# Patient Record
Sex: MALE | Race: BLACK OR AFRICAN AMERICAN | Employment: UNEMPLOYED | URBAN - METROPOLITAN AREA
[De-identification: names, ages, dates, MRNs, and addresses within clinical notes are randomized per-mention and may not be internally consistent; named-entity substitution may affect disease eponyms.]

---

## 2018-07-15 ENCOUNTER — HOSPITAL ENCOUNTER (EMERGENCY)
Age: 48
Discharge: HOME | End: 2018-07-15
Attending: INTERNAL MEDICINE
Payer: MEDICARE

## 2018-07-15 VITALS
DIASTOLIC BLOOD PRESSURE: 73 MMHG | BODY MASS INDEX: 25.07 KG/M2 | SYSTOLIC BLOOD PRESSURE: 159 MMHG | RESPIRATION RATE: 16 BRPM | HEIGHT: 66 IN | OXYGEN SATURATION: 99 % | HEART RATE: 71 BPM | TEMPERATURE: 98.6 F | WEIGHT: 156 LBS

## 2018-07-15 DIAGNOSIS — J20.9 ACUTE BRONCHITIS, UNSPECIFIED ORGANISM: Primary | ICD-10-CM

## 2018-07-15 DIAGNOSIS — Z72.0 TOBACCO ABUSE: ICD-10-CM

## 2018-07-15 DIAGNOSIS — I10 ESSENTIAL HYPERTENSION: ICD-10-CM

## 2018-07-15 PROCEDURE — 94640 AIRWAY INHALATION TREATMENT: CPT

## 2018-07-15 PROCEDURE — 74011000250 HC RX REV CODE- 250: Performed by: INTERNAL MEDICINE

## 2018-07-15 PROCEDURE — 96372 THER/PROPH/DIAG INJ SC/IM: CPT

## 2018-07-15 PROCEDURE — 77030029684 HC NEB SM VOL KT MONA -A

## 2018-07-15 PROCEDURE — 74011250636 HC RX REV CODE- 250/636: Performed by: INTERNAL MEDICINE

## 2018-07-15 PROCEDURE — 99282 EMERGENCY DEPT VISIT SF MDM: CPT

## 2018-07-15 RX ORDER — IPRATROPIUM BROMIDE AND ALBUTEROL SULFATE 2.5; .5 MG/3ML; MG/3ML
3 SOLUTION RESPIRATORY (INHALATION)
Status: COMPLETED | OUTPATIENT
Start: 2018-07-15 | End: 2018-07-15

## 2018-07-15 RX ORDER — CARVEDILOL 25 MG/1
25 TABLET ORAL 2 TIMES DAILY WITH MEALS
Status: ON HOLD | COMMUNITY
End: 2021-11-10 | Stop reason: CLARIF

## 2018-07-15 RX ORDER — FOSINOPIRL SODIUM 10 MG/1
40 TABLET ORAL DAILY
Status: ON HOLD | COMMUNITY
End: 2021-11-10 | Stop reason: CLARIF

## 2018-07-15 RX ORDER — DEXAMETHASONE SODIUM PHOSPHATE 100 MG/10ML
10 INJECTION INTRAMUSCULAR; INTRAVENOUS
Status: COMPLETED | OUTPATIENT
Start: 2018-07-15 | End: 2018-07-15

## 2018-07-15 RX ORDER — OMEPRAZOLE 40 MG/1
40 CAPSULE, DELAYED RELEASE ORAL DAILY
Status: ON HOLD | COMMUNITY
End: 2021-11-10 | Stop reason: CLARIF

## 2018-07-15 RX ADMIN — DEXAMETHASONE SODIUM PHOSPHATE 10 MG: 10 INJECTION INTRAMUSCULAR; INTRAVENOUS at 07:44

## 2018-07-15 RX ADMIN — IPRATROPIUM BROMIDE AND ALBUTEROL SULFATE 3 ML: .5; 3 SOLUTION RESPIRATORY (INHALATION) at 07:46

## 2018-07-15 NOTE — ED PROVIDER NOTES
EMERGENCY DEPARTMENT HISTORY AND PHYSICAL EXAM 
 
 
Date: 7/15/2018 Patient Name: Cristian Cadena History of Presenting Illness Chief Complaint Patient presents with  Hypertension History Provided By: Patient HPI: Cristian Cadena, 50 y.o. male with PMHx significant for CKD on dialysis T/Th/Sat, HTN, DM, presents ambulatory to the ED for evaluation of elevated BP readings this morning. Pt states he checked his BP using a RUE wrist cuff with readings 242/59 and 262/98 this morning PTA, which prompted him to come to the ED for evaluation. He reports he initially checked his BP due to generalized throbbing HA this morning. He endorses compliance with his BP medication. Pt further adds experiencing nonproductive cough and chronic SOB due to smoking. He states his last full dialysis treatment was yesterday and was evaluated by his PCP last week. Pt notes his PCP took him off of insulin 2 months ago because his A1C improved. He specifically denies any recent CP, dizziness, visual changes, fevers, chills, abdominal pain, N/V, or any other complaints at this time. There are no other complaints, changes, or physical findings at this time. PCP: Magalys Dotson MD 
 
Current Outpatient Prescriptions Medication Sig Dispense Refill  carvedilol (COREG) 25 mg tablet Take 25 mg by mouth two (2) times daily (with meals).  omeprazole (PRILOSEC) 40 mg capsule Take 40 mg by mouth daily.  fosinopril (MONOPRIL) 10 mg tablet Take 40 mg by mouth daily.  ipratropium-albuterol (COMBIVENT RESPIMAT)  mcg/actuation inhaler Take 1 Puff by inhalation every six (6) hours as needed for Wheezing. 1 Inhaler 0  
 sucralfate (CARAFATE) 1 gram tablet Take 1 Tab by mouth four (4) times daily. 120 Tab 2  
 gabapentin (NEURONTIN) 300 mg capsule   6  
 RENAGEL 800 mg tablet   4  cloNIDine HCl (CATAPRES) 0.2 mg tablet Take 1 Tab by mouth two (2) times a day.  60 Tab 0  
 amLODIPine (NORVASC) 10 mg tablet Take 1 Tab by mouth daily. 30 Tab 0  
 hydrALAZINE (APRESOLINE) 100 mg tablet Take 1 Tab by mouth two (2) times a day. 60 Tab 0  
 OTHER by Other route every thirty (30) days. Indications: with dialysis  ONETOUCH ULTRA TEST strip   0  
 LANTUS SOLOSTAR 100 unit/mL (3 mL) pen   6  pravastatin (PRAVACHOL) 10 mg tablet   10  
 RENVELA 800 mg tab tab   4 Past History Past Medical History: 
Past Medical History:  
Diagnosis Date  Chronic kidney disease   
 dialysis MWF  
 Diabetes ([address]  HTN (hypertension) Past Surgical History: 
Past Surgical History:  
Procedure Laterality Date  COLONOSCOPY N/A 7/28/2016 COLONOSCOPY performed by Ariadna Strong MD at [address] [place] [address]  VASCULAR SURGERY PROCEDURE UNLIST    
 dialysis shunt l arm 2012 Family History: 
Family History Problem Relation Age of Onset  Hypertension Mother  Hypertension Sister  Heart Attack Other  Diabetes Other Social History: 
Social History Substance Use Topics  Smoking status: Current Every Day Smoker Packs/day: 0.50  Smokeless tobacco: Never Used  Alcohol use No  
 
 
Allergies: 
No Known Allergies Review of Systems Review of Systems Constitutional: Negative. Negative for activity change, appetite change, chills, fatigue, fever and unexpected weight change. HENT: Negative. Negative for congestion, hearing loss, rhinorrhea, sneezing and voice change. Eyes: Negative. Negative for pain and visual disturbance. Respiratory: Negative. Negative for apnea, cough, choking, chest tightness and shortness of breath. Cardiovascular: Negative. Negative for chest pain and palpitations. Gastrointestinal: Negative. Negative for abdominal distention, abdominal pain, blood in stool, diarrhea, nausea and vomiting. Genitourinary: Negative. Negative for difficulty urinating, flank pain, frequency and urgency. Musculoskeletal: Negative.   Negative for arthralgias, back pain, myalgias and neck stiffness. Skin: Negative. Negative for color change and rash. Neurological: Positive for headaches. Negative for dizziness, seizures, syncope, speech difficulty, weakness and numbness. Hematological: Negative for adenopathy. Psychiatric/Behavioral: Negative. Negative for agitation, behavioral problems, dysphoric mood and suicidal ideas. The patient is not nervous/anxious. Physical Exam  
Physical Exam  
Constitutional: He is oriented to person, place, and time. He appears well-developed and well-nourished. HENT:  
Head: Normocephalic and atraumatic. Mouth/Throat: Oropharynx is clear and moist.  
Eyes: Conjunctivae and EOM are normal. Pupils are equal, round, and reactive to light. Neck: Normal range of motion. Neck supple. Cardiovascular: Normal rate, regular rhythm and normal heart sounds. Exam reveals no gallop and no friction rub. No murmur heard. Pulmonary/Chest: Effort normal. No respiratory distress. He has wheezes. He has rhonchi. He has no rales. Abdominal: Soft. Bowel sounds are normal. He exhibits no distension. There is no tenderness. There is no rebound and no guarding. Musculoskeletal: Normal range of motion. He exhibits no edema or tenderness. Lymphadenopathy:  
  He has no cervical adenopathy. Neurological: He is alert and oriented to person, place, and time. He has normal strength. No cranial nerve deficit or sensory deficit. He displays a negative Romberg sign. Coordination and gait normal.  
Skin: Skin is warm and dry. No ecchymosis, no lesion and no rash noted. Rash is not urticarial. He is not diaphoretic. No erythema. Psychiatric: He has a normal mood and affect. Nursing note and vitals reviewed. Diagnostic Study Results Labs - No results found for this or any previous visit (from the past 12 hour(s)). Radiologic Studies - No orders to display Medical Decision Making I am the first provider for this patient. I reviewed the vital signs, available nursing notes, past medical history, past surgical history, family history and social history. Vital Signs-Reviewed the patient's vital signs. Patient Vitals for the past 12 hrs: 
 Temp Pulse Resp BP SpO2  
07/15/18 0747 - - - - 99 % 07/15/18 0706 98.6 °F (37 °C) 71 16 159/73 98 % Pulse Oximetry Analysis - 99% on RA Records Reviewed: Nursing Notes and Old Medical Records Provider Notes (Medical Decision Making): DDx: HTN, hypertensive urgency, bronchitis, tobacco abuse ED Course:  
Initial assessment performed. The patients presenting problems have been discussed, and they are in agreement with the care plan formulated and outlined with them. I have encouraged them to ask questions as they arise throughout their visit. Tobacco Counseling Discussed the risks of smoking and the benefits of smoking cessation as well as the long term sequelae of smoking with the patient. The patient verbalized their understanding. 8:55 AM 
Re-evaluated pt. He states his breathing has improved with duo-neb and decadron. Will discharge with prescription. He agrees to f/u with his PCP next week. Discharge Note: 
9:00 AM 
The patient has been re-evaluated and is ready for discharge. Reviewed available results with patient. Counseled patient on diagnosis and care plan. Patient has expressed understanding, and all questions have been answered. Patient agrees with plan and agrees to follow up as recommended, or to return to the ED if their symptoms worsen. Discharge instructions have been provided and explained to the patient, along with reasons to return to the ED. PLAN: 
1. Discharge Medication List as of 7/15/2018  9:11 AM  
  
START taking these medications  Details  
ipratropium-albuterol (COMBIVENT RESPIMAT)  mcg/actuation inhaler Take 1 Puff by inhalation every six (6) hours as needed for Wheezing., Print, Disp-1 Inhaler, R-0  
 CONTINUE these medications which have NOT CHANGED Details  
carvedilol (COREG) 25 mg tablet Take 25 mg by mouth two (2) times daily (with meals). , Historical Med  
  
omeprazole (PRILOSEC) 40 mg capsule Take 40 mg by mouth daily. , Historical Med  
  
fosinopril (MONOPRIL) 10 mg tablet Take 40 mg by mouth daily. , Historical Med  
  
sucralfate (CARAFATE) 1 gram tablet Take 1 Tab by mouth four (4) times daily. , Print, Disp-120 Tab, R-2  
  
gabapentin (NEURONTIN) 300 mg capsule Historical Med, R-6  
  
RENAGEL 800 mg tablet Historical Med, R-4  
  
cloNIDine HCl (CATAPRES) 0.2 mg tablet Take 1 Tab by mouth two (2) times a day., Print, Disp-60 Tab, R-0  
  
amLODIPine (NORVASC) 10 mg tablet Take 1 Tab by mouth daily. , Print, Disp-30 Tab, R-0  
  
hydrALAZINE (APRESOLINE) 100 mg tablet Take 1 Tab by mouth two (2) times a day., Print, Disp-60 Tab, R-0  
  
OTHER by Other route every thirty (30) days. Indications: with dialysis, Historical Med ONETOUCH ULTRA TEST strip Historical Med, R-0  
  
LANTUS SOLOSTAR 100 unit/mL (3 mL) pen Historical Med, R-6  
  
pravastatin (PRAVACHOL) 10 mg tablet Historical Med, R-10 RENVELA 800 mg tab tab Historical Med, R-4  
  
  
 
2. Follow-up Information Follow up With Details Comments Contact Info Catherine Hooper, [address] 
[phone] Return to ED if worse Diagnosis Clinical Impression: 1. Acute bronchitis, unspecified organism 2. Tobacco abuse 3. Essential hypertension Attestations: This note is prepared by Mesha Chris, acting as Scribe for Estrellita Hawkins MD. 
 
The scribe's documentation has been prepared under my direction and personally reviewed by me in its entirety. I confirm that the note above accurately reflects all work, treatment, procedures, and medical decision making performed by me.  
Estrellita Hawkins MD

## 2018-07-15 NOTE — ED TRIAGE NOTES
Pt here for evaluation of B/P + hx of dialysis, DM, and HTN. + non productive cough. Left upper arm AVG.  + bruit & thrill

## 2018-07-15 NOTE — DISCHARGE INSTRUCTIONS
Bronchitis: Care Instructions  Your Care Instructions    Bronchitis is inflammation of the bronchial tubes, which carry air to the lungs. The tubes swell and produce mucus, or phlegm. The mucus and inflamed bronchial tubes make you cough. You may have trouble breathing. Most cases of bronchitis are caused by viruses like those that cause colds. Antibiotics usually do not help and they may be harmful. Bronchitis usually develops rapidly and lasts about 2 to 3 weeks in otherwise healthy people. Follow-up care is a key part of your treatment and safety. Be sure to make and go to all appointments, and call your doctor if you are having problems. It's also a good idea to know your test results and keep a list of the medicines you take. How can you care for yourself at home? · Take all medicines exactly as prescribed. Call your doctor if you think you are having a problem with your medicine. · Get some extra rest.  · Take an over-the-counter pain medicine, such as acetaminophen (Tylenol), ibuprofen (Advil, Motrin), or naproxen (Aleve) to reduce fever and relieve body aches. Read and follow all instructions on the label. · Do not take two or more pain medicines at the same time unless the doctor told you to. Many pain medicines have acetaminophen, which is Tylenol. Too much acetaminophen (Tylenol) can be harmful. · Take an over-the-counter cough medicine that contains dextromethorphan to help quiet a dry, hacking cough so that you can sleep. Avoid cough medicines that have more than one active ingredient. Read and follow all instructions on the label. · Breathe moist air from a humidifier, hot shower, or sink filled with hot water. The heat and moisture will thin mucus so you can cough it out. · Do not smoke. Smoking can make bronchitis worse. If you need help quitting, talk to your doctor about stop-smoking programs and medicines. These can increase your chances of quitting for good.   When should you call for help? Call 911 anytime you think you may need emergency care. For example, call if:    · You have severe trouble breathing.    Call your doctor now or seek immediate medical care if:    · You have new or worse trouble breathing.     · You cough up dark brown or bloody mucus (sputum).     · You have a new or higher fever.     · You have a new rash.    Watch closely for changes in your health, and be sure to contact your doctor if:    · You cough more deeply or more often, especially if you notice more mucus or a change in the color of your mucus.     · You are not getting better as expected. Where can you learn more? Go to http://khanh-aleksander.info/. Enter H333 in the search box to learn more about \"Bronchitis: Care Instructions. \"  Current as of: December 6, 2017  Content Version: 11.7  © 4590-4473 [employer]. Care instructions adapted under license by [employer] (which disclaims liability or warranty for this information). If you have questions about a medical condition or this instruction, always ask your healthcare professional. [address] any warranty or liability for your use of this information. DASH Diet: Care Instructions  Your Care Instructions    The DASH diet is an eating plan that can help lower your blood pressure. DASH stands for Dietary Approaches to Stop Hypertension. Hypertension is high blood pressure. The DASH diet focuses on eating foods that are high in calcium, potassium, and magnesium. These nutrients can lower blood pressure. The foods that are highest in these nutrients are fruits, vegetables, low-fat dairy products, nuts, seeds, and legumes. But taking calcium, potassium, and magnesium supplements instead of eating foods that are high in those nutrients does not have the same effect. The DASH diet also includes whole grains, fish, and poultry.   The DASH diet is one of several lifestyle changes your doctor may recommend to lower your high blood pressure. Your doctor may also want you to decrease the amount of sodium in your diet. Lowering sodium while following the DASH diet can lower blood pressure even further than just the DASH diet alone. Follow-up care is a key part of your treatment and safety. Be sure to make and go to all appointments, and call your doctor if you are having problems. It's also a good idea to know your test results and keep a list of the medicines you take. How can you care for yourself at home? Following the DASH diet  · Eat 4 to 5 servings of fruit each day. A serving is 1 medium-sized piece of fruit, ½ cup chopped or canned fruit, 1/4 cup dried fruit, or 4 ounces (½ cup) of fruit juice. Choose fruit more often than fruit juice. · Eat 4 to 5 servings of vegetables each day. A serving is 1 cup of lettuce or raw leafy vegetables, ½ cup of chopped or cooked vegetables, or 4 ounces (½ cup) of vegetable juice. Choose vegetables more often than vegetable juice. · Get 2 to 3 servings of low-fat and fat-free dairy each day. A serving is 8 ounces of milk, 1 cup of yogurt, or 1 ½ ounces of cheese. · Eat 6 to 8 servings of grains each day. A serving is 1 slice of bread, 1 ounce of dry cereal, or ½ cup of cooked rice, pasta, or cooked cereal. Try to choose whole-grain products as much as possible. · Limit lean meat, poultry, and fish to 2 servings each day. A serving is 3 ounces, about the size of a deck of cards. · Eat 4 to 5 servings of nuts, seeds, and legumes (cooked dried beans, lentils, and split peas) each week. A serving is 1/3 cup of nuts, 2 tablespoons of seeds, or ½ cup of cooked beans or peas. · Limit fats and oils to 2 to 3 servings each day. A serving is 1 teaspoon of vegetable oil or 2 tablespoons of salad dressing. · Limit sweets and added sugars to 5 servings or less a week. A serving is 1 tablespoon jelly or jam, ½ cup sorbet, or 1 cup of lemonade.   · Eat less than 2,300 milligrams (mg) of sodium a day. If you limit your sodium to 1,500 mg a day, you can lower your blood pressure even more. Tips for success  · Start small. Do not try to make dramatic changes to your diet all at once. You might feel that you are missing out on your favorite foods and then be more likely to not follow the plan. Make small changes, and stick with them. Once those changes become habit, add a few more changes. · Try some of the following:  ¨ Make it a goal to eat a fruit or vegetable at every meal and at snacks. This will make it easy to get the recommended amount of fruits and vegetables each day. ¨ Try yogurt topped with fruit and nuts for a snack or healthy dessert. ¨ Add lettuce, tomato, cucumber, and onion to sandwiches. ¨ Combine a ready-made pizza crust with low-fat mozzarella cheese and lots of vegetable toppings. Try using tomatoes, squash, spinach, broccoli, carrots, cauliflower, and onions. ¨ Have a variety of cut-up vegetables with a low-fat dip as an appetizer instead of chips and dip. ¨ Sprinkle sunflower seeds or chopped almonds over salads. Or try adding chopped walnuts or almonds to cooked vegetables. ¨ Try some vegetarian meals using beans and peas. Add garbanzo or kidney beans to salads. Make burritos and tacos with mashed reyes beans or black beans. Where can you learn more? Go to http://khanh-aleksander.info/. Enter K504 in the search box to learn more about \"DASH Diet: Care Instructions. \"  Current as of: December 6, 2017  Content Version: 11.7  © 4317-3484 [employer]. Care instructions adapted under license by [employer] (which disclaims liability or warranty for this information). If you have questions about a medical condition or this instruction, always ask your healthcare professional. [address] any warranty or liability for your use of this information.          Acute High Blood Pressure: Care Instructions  Your Care Instructions    Acute high blood pressure is very high blood pressure. It's a serious problem. Very high blood pressure can damage your brain, heart, eyes, and kidneys. You may have been given medicines to lower your blood pressure. You may have gotten them as pills or through a needle in one of your veins. This is called an IV. And maybe you were given other medicines too. These can be needed when high blood pressure causes other problems. To keep your blood pressure at a lower level, you may need to make healthy lifestyle changes. And you will probably need to take medicines. Be sure to follow up with your doctor about your blood pressure and what you can do about it. Follow-up care is a key part of your treatment and safety. Be sure to make and go to all appointments, and call your doctor if you are having problems. It's also a good idea to know your test results and keep a list of the medicines you take. How can you care for yourself at home? · See your doctor as often as he or she recommends. This is to make sure your blood pressure is under control. You will probably go at least 2 times a year. But it may be more often at first.  · Take your blood pressure medicine exactly as prescribed. You may take one or more types. They include diuretics, beta-blockers, ACE inhibitors, calcium channel blockers, and angiotensin II receptor blockers. Call your doctor if you think you are having a problem with your medicine. · If you take blood pressure medicine, talk to your doctor before you take decongestants or anti-inflammatory medicine, such as ibuprofen. These can raise blood pressure. · Learn how to check your blood pressure at home. Check it often. · Ask your doctor if it's okay to drink alcohol. · Talk to your doctor about lifestyle changes that can help blood pressure. These include being active and not smoking. When should you call for help?   Call 911 anytime you think you may need emergency care. This may mean having symptoms that suggest that your blood pressure is causing a serious heart or blood vessel problem. Your blood pressure may be over 180/110.   For example, call 911 if:    · You have symptoms of a heart attack. These may include:  ¨ Chest pain or pressure, or a strange feeling in the chest.  ¨ Sweating. ¨ Shortness of breath. ¨ Nausea or vomiting. ¨ Pain, pressure, or a strange feeling in the back, neck, jaw, or upper belly or in one or both shoulders or arms. ¨ Lightheadedness or sudden weakness. ¨ A fast or irregular heartbeat.     · You have symptoms of a stroke. These may include:  ¨ Sudden numbness, tingling, weakness, or loss of movement in your face, arm, or leg, especially on only one side of your body. ¨ Sudden vision changes. ¨ Sudden trouble speaking. ¨ Sudden confusion or trouble understanding simple statements. ¨ Sudden problems with walking or balance. ¨ A sudden, severe headache that is different from past headaches.     · You have severe back or belly pain.    Do not wait until your blood pressure comes down on its own. Get help right away.   Call your doctor now or seek immediate care if:    · Your blood pressure is much higher than normal (such as 180/110 or higher), but you don't have symptoms.     · You think high blood pressure is causing symptoms, such as:  ¨ Severe headache. ¨ Blurry vision.    Watch closely for changes in your health, and be sure to contact your doctor if:    · Your blood pressure measures 140/90 or higher at least 2 times. That means the top number is 140 or higher or the bottom number is 90 or higher, or both.     · You think you may be having side effects from your blood pressure medicine.     · Your blood pressure is usually normal, but it goes above normal at least 2 times. Where can you learn more? Go to http://khanh-aleksander.info/.   Enter Q590 in the search box to learn more about \"Acute High Blood Pressure: Care Instructions. \"  Current as of: May 10, 2017  Content Version: 11.7  © 5821-6112 [employer]. Care instructions adapted under license by [employer] (which disclaims liability or warranty for this information). If you have questions about a medical condition or this instruction, always ask your healthcare professional. [place] [address] any warranty or liability for your use of this information.

## 2018-10-05 ENCOUNTER — HOSPITAL ENCOUNTER (EMERGENCY)
Age: 48
Discharge: LEFT BEFORE DISCHARGE | End: 2018-10-05
Attending: EMERGENCY MEDICINE
Payer: MEDICARE

## 2018-10-05 ENCOUNTER — HOSPITAL ENCOUNTER (EMERGENCY)
Age: 48
Discharge: OTHER INSTITUTION | End: 2018-10-05
Attending: EMERGENCY MEDICINE
Payer: MEDICARE

## 2018-10-05 ENCOUNTER — APPOINTMENT (OUTPATIENT)
Dept: GENERAL RADIOLOGY | Age: 48
End: 2018-10-05
Attending: EMERGENCY MEDICINE
Payer: MEDICARE

## 2018-10-05 VITALS
RESPIRATION RATE: 14 BRPM | SYSTOLIC BLOOD PRESSURE: 177 MMHG | HEIGHT: 66 IN | OXYGEN SATURATION: 92 % | HEART RATE: 66 BPM | WEIGHT: 162 LBS | BODY MASS INDEX: 26.03 KG/M2 | DIASTOLIC BLOOD PRESSURE: 93 MMHG | TEMPERATURE: 98.5 F

## 2018-10-05 VITALS
DIASTOLIC BLOOD PRESSURE: 94 MMHG | HEART RATE: 57 BPM | WEIGHT: 165 LBS | BODY MASS INDEX: 26.52 KG/M2 | RESPIRATION RATE: 14 BRPM | HEIGHT: 66 IN | SYSTOLIC BLOOD PRESSURE: 184 MMHG | OXYGEN SATURATION: 96 %

## 2018-10-05 DIAGNOSIS — E87.5 ACUTE HYPERKALEMIA: ICD-10-CM

## 2018-10-05 DIAGNOSIS — Z99.2 ESRD ON HEMODIALYSIS (HCC): ICD-10-CM

## 2018-10-05 DIAGNOSIS — N18.6 ESRD (END STAGE RENAL DISEASE) (HCC): ICD-10-CM

## 2018-10-05 DIAGNOSIS — E87.5 ACUTE HYPERKALEMIA: Primary | ICD-10-CM

## 2018-10-05 DIAGNOSIS — E87.79 OTHER HYPERVOLEMIA: ICD-10-CM

## 2018-10-05 DIAGNOSIS — N18.6 ESRD ON HEMODIALYSIS (HCC): ICD-10-CM

## 2018-10-05 DIAGNOSIS — N19 UREMIA: ICD-10-CM

## 2018-10-05 DIAGNOSIS — I16.0 HYPERTENSIVE URGENCY: Primary | ICD-10-CM

## 2018-10-05 LAB
ALBUMIN SERPL-MCNC: 4 G/DL (ref 3.5–5)
ALBUMIN/GLOB SERPL: 0.9 {RATIO} (ref 1.1–2.2)
ALP SERPL-CCNC: 89 U/L (ref 45–117)
ALT SERPL-CCNC: 22 U/L (ref 12–78)
ANION GAP SERPL CALC-SCNC: 15 MMOL/L (ref 5–15)
AST SERPL-CCNC: 14 U/L (ref 15–37)
ATRIAL RATE: 77 BPM
BASOPHILS # BLD: 0 K/UL (ref 0–0.1)
BASOPHILS NFR BLD: 0 % (ref 0–1)
BILIRUB SERPL-MCNC: 0.7 MG/DL (ref 0.2–1)
BUN SERPL-MCNC: 135 MG/DL (ref 6–20)
BUN/CREAT SERPL: 7 (ref 12–20)
CALCIUM SERPL-MCNC: 9.2 MG/DL (ref 8.5–10.1)
CALCULATED P AXIS, ECG09: 65 DEGREES
CALCULATED R AXIS, ECG10: -11 DEGREES
CALCULATED T AXIS, ECG11: 107 DEGREES
CHLORIDE SERPL-SCNC: 99 MMOL/L (ref 97–108)
CO2 SERPL-SCNC: 23 MMOL/L (ref 21–32)
CREAT SERPL-MCNC: 20.39 MG/DL (ref 0.7–1.3)
DIAGNOSIS, 93000: NORMAL
DIFFERENTIAL METHOD BLD: ABNORMAL
EOSINOPHIL # BLD: 0.3 K/UL (ref 0–0.4)
EOSINOPHIL NFR BLD: 3 % (ref 0–7)
ERYTHROCYTE [DISTWIDTH] IN BLOOD BY AUTOMATED COUNT: 16.9 % (ref 11.5–14.5)
GLOBULIN SER CALC-MCNC: 4.3 G/DL (ref 2–4)
GLUCOSE BLD STRIP.AUTO-MCNC: 133 MG/DL (ref 65–100)
GLUCOSE BLD STRIP.AUTO-MCNC: 138 MG/DL (ref 65–100)
GLUCOSE BLD STRIP.AUTO-MCNC: 30 MG/DL (ref 65–100)
GLUCOSE BLD STRIP.AUTO-MCNC: 32 MG/DL (ref 65–100)
GLUCOSE SERPL-MCNC: 79 MG/DL (ref 65–100)
HCT VFR BLD AUTO: 33.4 % (ref 36.6–50.3)
HGB BLD-MCNC: 11.2 G/DL (ref 12.1–17)
IMM GRANULOCYTES # BLD: 0 K/UL (ref 0–0.04)
IMM GRANULOCYTES NFR BLD AUTO: 0 % (ref 0–0.5)
LYMPHOCYTES # BLD: 1.4 K/UL (ref 0.8–3.5)
LYMPHOCYTES NFR BLD: 17 % (ref 12–49)
MCH RBC QN AUTO: 29.4 PG (ref 26–34)
MCHC RBC AUTO-ENTMCNC: 33.5 G/DL (ref 30–36.5)
MCV RBC AUTO: 87.7 FL (ref 80–99)
MONOCYTES # BLD: 0.6 K/UL (ref 0–1)
MONOCYTES NFR BLD: 8 % (ref 5–13)
NEUTS SEG # BLD: 5.9 K/UL (ref 1.8–8)
NEUTS SEG NFR BLD: 72 % (ref 32–75)
NRBC # BLD: 0 K/UL (ref 0–0.01)
NRBC BLD-RTO: 0 PER 100 WBC
P-R INTERVAL, ECG05: 154 MS
PLATELET # BLD AUTO: 144 K/UL (ref 150–400)
PMV BLD AUTO: 11.2 FL (ref 8.9–12.9)
POTASSIUM SERPL-SCNC: 6.7 MMOL/L (ref 3.5–5.1)
PROT SERPL-MCNC: 8.3 G/DL (ref 6.4–8.2)
Q-T INTERVAL, ECG07: 396 MS
QRS DURATION, ECG06: 88 MS
QTC CALCULATION (BEZET), ECG08: 448 MS
RBC # BLD AUTO: 3.81 M/UL (ref 4.1–5.7)
SERVICE CMNT-IMP: ABNORMAL
SODIUM SERPL-SCNC: 137 MMOL/L (ref 136–145)
VENTRICULAR RATE, ECG03: 77 BPM
WBC # BLD AUTO: 8.2 K/UL (ref 4.1–11.1)

## 2018-10-05 PROCEDURE — 74011636637 HC RX REV CODE- 636/637: Performed by: EMERGENCY MEDICINE

## 2018-10-05 PROCEDURE — 99285 EMERGENCY DEPT VISIT HI MDM: CPT

## 2018-10-05 PROCEDURE — 36415 COLL VENOUS BLD VENIPUNCTURE: CPT | Performed by: EMERGENCY MEDICINE

## 2018-10-05 PROCEDURE — 93005 ELECTROCARDIOGRAM TRACING: CPT

## 2018-10-05 PROCEDURE — 74011000250 HC RX REV CODE- 250: Performed by: EMERGENCY MEDICINE

## 2018-10-05 PROCEDURE — 85025 COMPLETE CBC W/AUTO DIFF WBC: CPT | Performed by: EMERGENCY MEDICINE

## 2018-10-05 PROCEDURE — 80053 COMPREHEN METABOLIC PANEL: CPT | Performed by: EMERGENCY MEDICINE

## 2018-10-05 PROCEDURE — 96375 TX/PRO/DX INJ NEW DRUG ADDON: CPT

## 2018-10-05 PROCEDURE — 71045 X-RAY EXAM CHEST 1 VIEW: CPT

## 2018-10-05 PROCEDURE — 96374 THER/PROPH/DIAG INJ IV PUSH: CPT

## 2018-10-05 PROCEDURE — 74011250637 HC RX REV CODE- 250/637: Performed by: EMERGENCY MEDICINE

## 2018-10-05 PROCEDURE — 82962 GLUCOSE BLOOD TEST: CPT

## 2018-10-05 PROCEDURE — 74011250636 HC RX REV CODE- 250/636: Performed by: EMERGENCY MEDICINE

## 2018-10-05 RX ORDER — SODIUM BICARBONATE 1 MEQ/ML
50 SYRINGE (ML) INTRAVENOUS
Status: COMPLETED | OUTPATIENT
Start: 2018-10-05 | End: 2018-10-05

## 2018-10-05 RX ORDER — AMLODIPINE BESYLATE 5 MG/1
10 TABLET ORAL
Status: COMPLETED | OUTPATIENT
Start: 2018-10-05 | End: 2018-10-05

## 2018-10-05 RX ORDER — DEXTROSE 50 % IN WATER (D50W) INTRAVENOUS SYRINGE
50
Status: COMPLETED | OUTPATIENT
Start: 2018-10-05 | End: 2018-10-05

## 2018-10-05 RX ORDER — CALCIUM GLUCONATE 94 MG/ML
1 INJECTION, SOLUTION INTRAVENOUS
Status: COMPLETED | OUTPATIENT
Start: 2018-10-05 | End: 2018-10-05

## 2018-10-05 RX ORDER — CLONIDINE HYDROCHLORIDE 0.1 MG/1
0.2 TABLET ORAL
Status: COMPLETED | OUTPATIENT
Start: 2018-10-05 | End: 2018-10-05

## 2018-10-05 RX ADMIN — HUMAN INSULIN 10 UNITS: 100 INJECTION, SOLUTION SUBCUTANEOUS at 11:41

## 2018-10-05 RX ADMIN — CLONIDINE HYDROCHLORIDE 0.2 MG: 0.1 TABLET ORAL at 11:19

## 2018-10-05 RX ADMIN — NITROGLYCERIN 1 INCH: 20 OINTMENT TOPICAL at 10:35

## 2018-10-05 RX ADMIN — DEXTROSE MONOHYDRATE 25 G: 25 INJECTION, SOLUTION INTRAVENOUS at 11:40

## 2018-10-05 RX ADMIN — AMLODIPINE BESYLATE 10 MG: 5 TABLET ORAL at 11:19

## 2018-10-05 RX ADMIN — SODIUM BICARBONATE 50 MEQ: 84 INJECTION, SOLUTION INTRAVENOUS at 11:41

## 2018-10-05 RX ADMIN — DEXTROSE MONOHYDRATE 25 G: 25 INJECTION, SOLUTION INTRAVENOUS at 13:16

## 2018-10-05 RX ADMIN — CALCIUM GLUCONATE 1 G: 98 INJECTION, SOLUTION INTRAVENOUS at 11:41

## 2018-10-05 NOTE — ED NOTES
Per pt reports BLE swelling, intermittent, aching, left sided chest pain that started today and sob. Pt reports missing dialysis for two weeks, [place] [facility] [place] pending lab work. Schedule for dialysis is Tue/Thur/Sat. Emergency Department Nursing Plan of Care The Nursing Plan of Care is developed from the Nursing assessment and Emergency Department Attending provider initial evaluation. The plan of care may be reviewed in the ED Provider note. The Plan of Care was developed with the following considerations:  
Patient / Family readiness to learn indicated by:verbalized understanding Persons(s) to be included in education: patient Barriers to Learning/Limitations:No 
 
Signed Robert Somers RN   
10/5/2018   10:47 AM

## 2018-10-05 NOTE — ED NOTES
Assumed care of pt from EMS. Pt is transfer from [facility]. Pt is dialysis pt that has missed last 5 dialysis appointments. Pt reports pain in back of neck and lower back. Pt's blood sugar was 30, reported by EMS. EMS said D50 was given by [facility] and that last Blood sugar was 153. Current blood sugar is 138. Pt has edema BLE.

## 2018-10-05 NOTE — DIALYSIS
1847 in suite preparing machine to take to Emergency Room and received call from ER nurse patient had signed out [place].

## 2018-10-05 NOTE — ED TRIAGE NOTES
Patient presents via [facility] from home with concerns of fluid retention and HTN after missing nearly one week of HD. T/R/Sa hemodialysis. LUE AV fistula. CBG 77 en route. Has not taken am medications today.

## 2018-10-05 NOTE — ED PROVIDER NOTES
EMERGENCY DEPARTMENT HISTORY AND PHYSICAL EXAM 
 
 
Date: 10/5/2018 Patient Name: Guzman Smith History of Presenting Illness Chief Complaint Patient presents with  Leg Swelling  
  BLE, missed 5 sessions of dialysis, transfer from [facility] History Provided By: Patient and EMS 
 
HPI: Guzman Smith, 50 y.o. male with PMHx significant for CKD, diabetes, and HTN, presents via EMS (transfer from [facility]) to the ED with cc of new onset, worsening of BLE swelling beginning 2 weeks ago. Pt reports he missed 5 sessions (past 2 weeks) of dialysis and also has not taken any of his prescribed medications today. Pt normally goes to dialysis on M/W/F. Pt is in the process of switching dialysis clinics, but was rejected from new clinic yesterday since they \"did not have his blood work. \" Pt visited ED today for dialysis. Pt's EKG was taken at [facility] and shows peaked T waves. Pt endorses tobacco (1ppd) and drug (cocaine) use, but denies EtOH consumption. Pt specifically denies SOB, CP, nausea, vomiting, abdominal pain, back pain. Social Hx: + (0.5ppd) Tobacco, - EtOH, + (cocaine) Illicit Drugs There are no other complaints, changes, or physical findings at this time. PCP: William Yanes MD 
 
Current Outpatient Prescriptions Medication Sig Dispense Refill  vit Bcomp,C/folic acid/zinc (DIALYVITE 800 WITH ZINC PO) Take  by mouth.  carvedilol (COREG) 25 mg tablet Take 25 mg by mouth two (2) times daily (with meals).  omeprazole (PRILOSEC) 40 mg capsule Take 40 mg by mouth daily.  fosinopril (MONOPRIL) 10 mg tablet Take 40 mg by mouth daily.  amLODIPine (NORVASC) 10 mg tablet Take 1 Tab by mouth daily. 30 Tab 0  
 hydrALAZINE (APRESOLINE) 100 mg tablet Take 1 Tab by mouth two (2) times a day. 60 Tab 0  
 sucralfate (CARAFATE) 1 gram tablet Take 1 Tab by mouth four (4) times daily.  120 Tab 2  
 ONETOUCH ULTRA TEST strip   0  
  gabapentin (NEURONTIN) 300 mg capsule   6  
 RENAGEL 800 mg tablet   4 Past History Past Medical History: 
Past Medical History:  
Diagnosis Date  Chronic kidney disease   
 dialysis MWF  
 Diabetes ([address]  HTN (hypertension) Past Surgical History: 
Past Surgical History:  
Procedure Laterality Date  COLONOSCOPY N/A 7/28/2016 COLONOSCOPY performed by Beather Osgood, MD at [address] [place] [address]  VASCULAR SURGERY PROCEDURE UNLIST    
 dialysis shunt l arm 2012 Family History: 
Family History Problem Relation Age of Onset  Hypertension Mother  Hypertension Sister  Heart Attack Other  Diabetes Other Social History: 
Social History Substance Use Topics  Smoking status: Current Every Day Smoker Packs/day: 0.50  Smokeless tobacco: Never Used  Alcohol use No  
 
 
Allergies: 
No Known Allergies Review of Systems Review of Systems Constitutional: Negative for appetite change, chills, fatigue and fever. HENT: Negative. Negative for congestion, rhinorrhea, sinus pressure and sore throat. Eyes: Negative. Respiratory: Negative. Negative for cough, choking, chest tightness, shortness of breath and wheezing. Cardiovascular: Positive for leg swelling (bilateral LE). Negative for chest pain and palpitations. Gastrointestinal: Negative for abdominal pain, constipation, diarrhea, nausea and vomiting. Endocrine: Negative. Genitourinary: Negative. Negative for difficulty urinating, dysuria, flank pain and urgency. Musculoskeletal: Negative. Skin: Negative. Neurological: Positive for weakness (generalized fatigue). Negative for dizziness, speech difficulty, light-headedness, numbness and headaches. Psychiatric/Behavioral: Negative. All other systems reviewed and are negative. Physical Exam  
Physical Exam  
Constitutional: He is oriented to person, place, and time.  He appears well-developed and well-nourished. No distress. HENT:  
Head: Normocephalic and atraumatic. Mouth/Throat: Oropharynx is clear and moist. No oropharyngeal exudate. Eyes: Conjunctivae and EOM are normal. Pupils are equal, round, and reactive to light. Neck: Normal range of motion. Neck supple. No JVD present. No tracheal deviation present. Cardiovascular: Normal rate, regular rhythm, normal heart sounds and intact distal pulses. No murmur heard. Pulmonary/Chest: Effort normal and breath sounds normal. No stridor. No respiratory distress. He has no wheezes. He has no rales. He exhibits no tenderness. Abdominal: Soft. He exhibits no distension. There is no tenderness. There is no rebound and no guarding. Musculoskeletal: Normal range of motion. He exhibits no edema or tenderness. Dialysis graft present in left upper extremity Neurological: He is alert and oriented to person, place, and time. No cranial nerve deficit. No gross motor or sensory deficits Skin: Skin is warm and dry. He is not diaphoretic. Psychiatric: He has a normal mood and affect. His behavior is normal.  
Nursing note and vitals reviewed. Diagnostic Study Results Labs - Recent Results (from the past 12 hour(s)) EKG, 12 LEAD, INITIAL Collection Time: 10/05/18 10:28 AM  
Result Value Ref Range Ventricular Rate 77 BPM  
 Atrial Rate 77 BPM  
 P-R Interval 154 ms QRS Duration 88 ms Q-T Interval 396 ms QTC Calculation (Bezet) 448 ms Calculated P Axis 65 degrees Calculated R Axis -11 degrees Calculated T Axis 107 degrees Diagnosis Normal sinus rhythm Possible Left atrial enlargement Left ventricular hypertrophy T wave abnormality, consider lateral ischemia Abnormal ECG No previous ECGs available CBC WITH AUTOMATED DIFF Collection Time: 10/05/18 10:36 AM  
Result Value Ref Range WBC 8.2 4.1 - 11.1 K/uL  
 RBC 3.81 (L) 4.10 - 5.70 M/uL  
 HGB 11.2 (L) 12.1 - 17.0 g/dL HCT 33.4 (L) 36.6 - 50.3 % MCV 87.7 80.0 - 99.0 FL  
 MCH 29.4 26.0 - 34.0 PG  
 MCHC 33.5 30.0 - 36.5 g/dL  
 RDW 16.9 (H) 11.5 - 14.5 % PLATELET 032 (L) 232 - 400 K/uL MPV 11.2 8.9 - 12.9 FL  
 NRBC 0.0 0  WBC ABSOLUTE NRBC 0.00 0.00 - 0.01 K/uL NEUTROPHILS 72 32 - 75 % LYMPHOCYTES 17 12 - 49 % MONOCYTES 8 5 - 13 % EOSINOPHILS 3 0 - 7 % BASOPHILS 0 0 - 1 % IMMATURE GRANULOCYTES 0 0.0 - 0.5 % ABS. NEUTROPHILS 5.9 1.8 - 8.0 K/UL  
 ABS. LYMPHOCYTES 1.4 0.8 - 3.5 K/UL  
 ABS. MONOCYTES 0.6 0.0 - 1.0 K/UL  
 ABS. EOSINOPHILS 0.3 0.0 - 0.4 K/UL  
 ABS. BASOPHILS 0.0 0.0 - 0.1 K/UL  
 ABS. IMM. GRANS. 0.0 0.00 - 0.04 K/UL  
 DF AUTOMATED METABOLIC PANEL, COMPREHENSIVE Collection Time: 10/05/18 10:36 AM  
Result Value Ref Range Sodium 137 136 - 145 mmol/L Potassium 6.7 (HH) 3.5 - 5.1 mmol/L Chloride 99 97 - 108 mmol/L  
 CO2 23 21 - 32 mmol/L Anion gap 15 5 - 15 mmol/L Glucose 79 65 - 100 mg/dL  (H) 6 - 20 MG/DL Creatinine 20.39 (H) 0.70 - 1.30 MG/DL  
 BUN/Creatinine ratio 7 (L) 12 - 20 GFR est AA 3 (L) >60 ml/min/1.73m2 GFR est non-AA 2 (L) >60 ml/min/1.73m2 Calcium 9.2 8.5 - 10.1 MG/DL Bilirubin, total 0.7 0.2 - 1.0 MG/DL  
 ALT (SGPT) 22 12 - 78 U/L  
 AST (SGOT) 14 (L) 15 - 37 U/L Alk. phosphatase 89 45 - 117 U/L Protein, total 8.3 (H) 6.4 - 8.2 g/dL Albumin 4.0 3.5 - 5.0 g/dL Globulin 4.3 (H) 2.0 - 4.0 g/dL A-G Ratio 0.9 (L) 1.1 - 2.2 GLUCOSE, POC Collection Time: 10/05/18  1:13 PM  
Result Value Ref Range Glucose (POC) 30 (LL) 65 - 100 mg/dL Performed by Per Olguin, POC Collection Time: 10/05/18  1:15 PM  
Result Value Ref Range Glucose (POC) 32 (LL) 65 - 100 mg/dL Performed by Per Olguin, POC Collection Time: 10/05/18  1:23 PM  
Result Value Ref Range Glucose (POC) 133 (H) 65 - 100 mg/dL  Performed by Per Olguin, POC  
 Collection Time: 10/05/18  2:17 PM  
Result Value Ref Range Glucose (POC) 138 (H) 65 - 100 mg/dL Performed by Rangel Chao (PCT) Radiologic Studies - CXR Results  (Last 48 hours) 10/05/18 1045  XR CHEST PORT Final result Impression:  IMPRESSION: Cardiomegaly and vascular congestion. Narrative:  INDICATION:  SOB, dialysis patient, no HD for 2 weeks, volume overload? COMPARISON:  Abdominal and pelvic x-ray  4/9/15 FINDINGS:   
A portable AP radiograph of the chest was obtained at 1038 hours. The patient is on a cardiac monitor. Suboptimal inspiration accentuates heart size and pulmonary markings. Junius El There appears to be mild cardiomegaly. Pulmonary vascular prominence and congestion with diffuse interstitial  
prominence and possible developing airspace disease. Possible small right pleural effusion. Medical Decision Making I am the first provider for this patient. I reviewed the vital signs, available nursing notes, past medical history, past surgical history, family history and social history. Vital Signs-Reviewed the patient's vital signs. Patient Vitals for the past 12 hrs: 
 Pulse Resp BP SpO2  
10/05/18 1645 (!) 57 14 (!) 184/94 96 % 10/05/18 1630 (!) 56 12 160/87 92 % 10/05/18 1615 (!) 55 13 178/80 95 % 10/05/18 1600 (!) 54 23 156/74 94 % 10/05/18 1545 (!) 56 11 160/86 94 % 10/05/18 1530 (!) 56 12 155/89 95 % 10/05/18 1515 (!) 56 12 152/85 94 % 10/05/18 1500 (!) 58 11 160/83 95 % 10/05/18 1445 (!) 59 12 166/88 96 % 10/05/18 1430 (!) 58 12 (!) 173/95 96 % 10/05/18 1415 61 16 171/90 97 % 10/05/18 1411 60 17 171/90 97 % Pulse Oximetry Analysis - 97% on RA Cardiac Monitor:  
Rate: 60 bpm 
Rhythm: Normal Sinus Rhythm Records Reviewed: Nursing Notes, Old Medical Records, Ambulance Run Sheet and Previous Laboratory Studies Provider Notes (Medical Decision Making):  
 Pt was referred for dialysis. ED Course:  
Initial assessment performed. The patients presenting problems have been discussed, and they are in agreement with the care plan formulated and outlined with them. I have encouraged them to ask questions as they arise throughout their visit. Upon arrival pt evaluated and Nephrology was consulted. Consult Note: 
3:50 PM 
Pepper Medrano DO spoke with Marky Grove MD, Specialty: nephrology Discussed pt's hx, disposition, and available diagnostic and imaging results. Reviewed care plans. Consultant agrees with plans as outlined. Dr. Nadira Hernandez evaluated pt and agreed to dialysis. PROGRESS NOTE: 
6:21 PM 
Pt had not had dialysis, Dialysis with unknown time to be done. Pt cannot stay for dialysis since he needs to be at work. Offered to call work, but pt refused since his work doesn't know he does dialysis and does not want them to know. Will proceed with discharge. Pt in no acute distress. Written by Jacques Renee ED Scribe as dictated by Pepper Medrano DO 
 
Critical Care Time: 0 minutes Disposition: 
6:22 PM 
I informed the pt that he needed dialysis for adequate treatment for his bilateral leg swelling, and that by refusing the above, he is at risk for ., myocardial infarction, paralysis, further deterioration, coma. He is awake, alert, and he understands his condition and the risks involved in leaving. The patient has received no analgesics, benzos, sedatives, etc.  
 
He is clinically aware of his surroundings and able to ask appropriate questions, the patient's nurse is present and confirms he is not clinically intoxicated and able to make medical decisions. He verbalized knowing the risks and understood it was recommended that he stay and could also return at any time.   He was provided with warnings regarding worsening of his condition and provided instructions to follow up with his Nephrologist at [facility] or return to the Emergency Room as soon as possible. This discussion was witnessed by nurse Beth Camacho. PLAN: 
1. Discharge Home Diagnosis Clinical Impression: 1. Acute hyperkalemia 2. Uremia 3. ESRD on hemodialysis ([facility] [address] Attestations: This note is prepared by Willian Chauhan, acting as Scribe for Berenice Bowman DO. The scribe's documentation has been prepared under my direction and personally reviewed by me in its entirety. I confirm that the note above accurately reflects all work, treatment, procedures, and medical decision making performed by me.  
Berenice Bowman DO

## 2018-10-05 NOTE — ED PROVIDER NOTES
EMERGENCY DEPARTMENT HISTORY AND PHYSICAL EXAM 
 
 
Date: 10/5/2018 Patient Name: Page Barakat History of Presenting Illness Chief Complaint Patient presents with  Hypertension History Provided By: Patient HPI: Page Barakat, 50 y.o. male with PMHx significant for CKD, DM, HTN, presents via EMS to the ED with cc of worsening bilateral leg swelling due to missed dialysis for the past 2 weeks. He is a Monday/wednesday/friday dialysis patient. Pt was in the process of switching dialysis clinics. During this process he has not been to a dialysis treatment over the past 2 weeks. He tried to get into his new dialysis clinic yesterday but was rejected due to them \"not having his blood work\". Today he reports his leg swelling is worse and he cannot wait for the center to approve his dialysis. His nephrologist is Dr. Javier Cummins who he has not seen during this period of time. Of note his BP in the room during H&P is 230/99, he does have a hx of HTN and is on medications for it. He denies any SOB, CP, nausea, vomiting, abdominal pain, back pain. His main reason for coming into the ED today was for dialysis. Social Hx: + Tobacco (0.5 ppd), - EtOH (-), + illicit drug use (cocaine) There are no other complaints, changes, or physical findings at this time. PCP: Juwan Barr MD 
 
Current Facility-Administered Medications Medication Dose Route Frequency Provider Last Rate Last Dose  insulin regular (NOVOLIN R, HUMULIN R) injection 10 Units  10 Units IntraVENous KAYLA Hummel MD      
 dextrose (D50W) injection syrg 25 g  50 mL IntraVENous KAYLA Hummel MD      
 calcium gluconate injection 1 g  1 g IntraVENous NOW Harsh Hummel MD      
 sodium bicarbonate 8.4 % (1 mEq/mL) injection 50 mEq  50 mEq IntraVENous KAYLA Hummel MD      
 
Current Outpatient Prescriptions Medication Sig Dispense Refill  vit Bcomp,C/folic acid/zinc (DIALYVITE 800 WITH ZINC PO) Take  by mouth.  carvedilol (COREG) 25 mg tablet Take 25 mg by mouth two (2) times daily (with meals).  omeprazole (PRILOSEC) 40 mg capsule Take 40 mg by mouth daily.  fosinopril (MONOPRIL) 10 mg tablet Take 40 mg by mouth daily.  amLODIPine (NORVASC) 10 mg tablet Take 1 Tab by mouth daily. 30 Tab 0  
 hydrALAZINE (APRESOLINE) 100 mg tablet Take 1 Tab by mouth two (2) times a day. 60 Tab 0  
 sucralfate (CARAFATE) 1 gram tablet Take 1 Tab by mouth four (4) times daily. 120 Tab 2  
 gabapentin (NEURONTIN) 300 mg capsule   6  
 RENAGEL 800 mg tablet   4  
 ONETOUCH ULTRA TEST strip   0 Past History Past Medical History: 
Past Medical History:  
Diagnosis Date  Chronic kidney disease   
 dialysis MWF  
 Diabetes ([address]  HTN (hypertension) Past Surgical History: 
Past Surgical History:  
Procedure Laterality Date  COLONOSCOPY N/A 7/28/2016 COLONOSCOPY performed by Catrina Cui MD at [address] [place] [address]  VASCULAR SURGERY PROCEDURE UNLIST    
 dialysis shunt l arm 2012 Family History: 
Family History Problem Relation Age of Onset  Hypertension Mother  Hypertension Sister  Heart Attack Other  Diabetes Other Social History: 
Social History Substance Use Topics  Smoking status: Current Every Day Smoker Packs/day: 0.50  Smokeless tobacco: Never Used  Alcohol use No  
 
 
Allergies: 
No Known Allergies Review of Systems Review of Systems Constitutional: Negative for chills and fever. HENT: Negative for congestion, rhinorrhea, sneezing and sore throat. Eyes: Negative for redness and visual disturbance. Respiratory: Negative for shortness of breath. Cardiovascular: Positive for leg swelling. Negative for chest pain. Gastrointestinal: Negative for abdominal pain, nausea and vomiting. Genitourinary: Negative for difficulty urinating and frequency. Musculoskeletal: Negative for back pain, myalgias and neck stiffness. Skin: Negative for rash. Neurological: Negative for dizziness, syncope, weakness and headaches. Hematological: Negative for adenopathy. All other systems reviewed and are negative. Physical Exam  
Physical Exam  
Constitutional: He is oriented to person, place, and time. He appears well-developed and well-nourished. Smells of tobacco.   
HENT:  
Head: Normocephalic and atraumatic. Mouth/Throat: Oropharynx is clear and moist and mucous membranes are normal.  
Eyes: EOM are normal.  
Neck: Normal range of motion and full passive range of motion without pain. Neck supple. Cardiovascular: Normal rate, regular rhythm, normal heart sounds, intact distal pulses and normal pulses. No murmur heard. Pulmonary/Chest: Effort normal and breath sounds normal. No respiratory distress. He exhibits no tenderness. Abdominal: Soft. Normal appearance and bowel sounds are normal. There is no tenderness. There is no rebound and no guarding. Musculoskeletal: Normal range of motion. He exhibits edema. He exhibits no tenderness or deformity. Trace bilateral lower extremity edema. Neurological: He is alert and oriented to person, place, and time. He has normal strength. Skin: Skin is warm, dry and intact. No rash noted. No erythema. Left upper extremity dialysis fistula with palpable thrill. Psychiatric: He has a normal mood and affect. His speech is normal and behavior is normal. Judgment and thought content normal.  
Nursing note and vitals reviewed. Diagnostic Study Results Labs - Recent Results (from the past 12 hour(s)) EKG, 12 LEAD, INITIAL Collection Time: 10/05/18 10:28 AM  
Result Value Ref Range Ventricular Rate 77 BPM  
 Atrial Rate 77 BPM  
 P-R Interval 154 ms QRS Duration 88 ms Q-T Interval 396 ms QTC Calculation (Bezet) 448 ms Calculated P Axis 65 degrees Calculated R Axis -11 degrees Calculated T Axis 107 degrees Diagnosis Normal sinus rhythm Possible Left atrial enlargement Left ventricular hypertrophy T wave abnormality, consider lateral ischemia Abnormal ECG No previous ECGs available CBC WITH AUTOMATED DIFF Collection Time: 10/05/18 10:36 AM  
Result Value Ref Range WBC 8.2 4.1 - 11.1 K/uL  
 RBC 3.81 (L) 4.10 - 5.70 M/uL  
 HGB 11.2 (L) 12.1 - 17.0 g/dL HCT 33.4 (L) 36.6 - 50.3 % MCV 87.7 80.0 - 99.0 FL  
 MCH 29.4 26.0 - 34.0 PG  
 MCHC 33.5 30.0 - 36.5 g/dL  
 RDW 16.9 (H) 11.5 - 14.5 % PLATELET 580 (L) 786 - 400 K/uL MPV 11.2 8.9 - 12.9 FL  
 NRBC 0.0 0  WBC ABSOLUTE NRBC 0.00 0.00 - 0.01 K/uL NEUTROPHILS 72 32 - 75 % LYMPHOCYTES 17 12 - 49 % MONOCYTES 8 5 - 13 % EOSINOPHILS 3 0 - 7 % BASOPHILS 0 0 - 1 % IMMATURE GRANULOCYTES 0 0.0 - 0.5 % ABS. NEUTROPHILS 5.9 1.8 - 8.0 K/UL  
 ABS. LYMPHOCYTES 1.4 0.8 - 3.5 K/UL  
 ABS. MONOCYTES 0.6 0.0 - 1.0 K/UL  
 ABS. EOSINOPHILS 0.3 0.0 - 0.4 K/UL  
 ABS. BASOPHILS 0.0 0.0 - 0.1 K/UL  
 ABS. IMM. GRANS. 0.0 0.00 - 0.04 K/UL  
 DF AUTOMATED METABOLIC PANEL, COMPREHENSIVE Collection Time: 10/05/18 10:36 AM  
Result Value Ref Range Sodium 137 136 - 145 mmol/L Potassium 6.7 (HH) 3.5 - 5.1 mmol/L Chloride 99 97 - 108 mmol/L  
 CO2 23 21 - 32 mmol/L Anion gap 15 5 - 15 mmol/L Glucose 79 65 - 100 mg/dL  (H) 6 - 20 MG/DL Creatinine 20.39 (H) 0.70 - 1.30 MG/DL  
 BUN/Creatinine ratio 7 (L) 12 - 20 GFR est AA 3 (L) >60 ml/min/1.73m2 GFR est non-AA 2 (L) >60 ml/min/1.73m2 Calcium 9.2 8.5 - 10.1 MG/DL Bilirubin, total 0.7 0.2 - 1.0 MG/DL  
 ALT (SGPT) 22 12 - 78 U/L  
 AST (SGOT) 14 (L) 15 - 37 U/L Alk. phosphatase 89 45 - 117 U/L Protein, total 8.3 (H) 6.4 - 8.2 g/dL Albumin 4.0 3.5 - 5.0 g/dL Globulin 4.3 (H) 2.0 - 4.0 g/dL A-G Ratio 0.9 (L) 1.1 - 2.2 Radiologic Studies -  
 CXR Results  (Last 48 hours) 10/05/18 1045  XR CHEST PORT Final result Impression:  IMPRESSION: Cardiomegaly and vascular congestion. Narrative:  INDICATION:  SOB, dialysis patient, no HD for 2 weeks, volume overload? COMPARISON:  Abdominal and pelvic x-ray  4/9/15 FINDINGS:   
A portable AP radiograph of the chest was obtained at 1038 hours. The patient is on a cardiac monitor. Suboptimal inspiration accentuates heart size and pulmonary markings. Kristine Katie There appears to be mild cardiomegaly. Pulmonary vascular prominence and congestion with diffuse interstitial  
prominence and possible developing airspace disease. Possible small right pleural effusion. Medical Decision Making I am the first provider for this patient. I reviewed the vital signs, available nursing notes, past medical history, past surgical history, family history and social history. Vital Signs-Reviewed the patient's vital signs. Patient Vitals for the past 12 hrs: 
 Temp Pulse Resp BP SpO2  
10/05/18 1130 - 72 18 (!) 223/108 97 % 10/05/18 1100 - 71 17 (!) 221/100 97 % 10/05/18 1030 - - - (!) 238/124 97 % 10/05/18 1025 - - - (!) 230/99 100 % 10/05/18 1018 98.5 °F (36.9 °C) 80 20 (!) 238/124 96 % Records Reviewed: Nursing Notes, Old Medical Records, Previous Radiology Studies and Previous Laboratory Studies Provider Notes (Medical Decision Making): DDx: Volume overload, electrolyte abnormality, noncompliance with medications/treatment. ED Course:  
Initial assessment performed. The patients presenting problems have been discussed, and they are in agreement with the care plan formulated and outlined with them. I have encouraged them to ask questions as they arise throughout their visit. Highly suspect pt will need transfer for emergent dialysis. Will await CXR and metabolic panel and will discuss with [facility] ED.  
 
ED EKG interpretation: 10:28 
 Rhythm: normal sinus rhythm; and regular . Rate (approx.): 77; Axis: normal; P wave: normal; QRS interval: normal ; ST/T wave: Peaked T waves; Other findings: left ventricular hypertrophy. This EKG was interpreted by Damon Morris MD,ED Provider. PROGRESS NOTE: 
11:02 AM 
Pt did not take any of his prescribed medications today, will need to order some. Since pt does not make urine I cannot check a UDS, he does have a hx of cocaine use in the past.  
 
PROGRESS NOTE: 
11:11 AM 
Pt's potassium is 6.7, also with peaked T waves on EKG. Will call transfer center for [facility]. Have ordered Ca2+, insulin, glucose, and sodium bicarb for acute hyperkalemia management while awaiting transfer and dialysis. PROGRESS NOTE: 
11:17 AM 
Spoke with Fabio Mendes DO at [facility], he has agreed to accept the pt in an ED to ED transfer to [facility]. CRITICAL CARE NOTE : 
 
11:29 AM 
 
IMPENDING DETERIORATION -Airway, Respiratory, Cardiovascular, Metabolic and Renal 
 
ASSOCIATED RISK FACTORS - Dysrhythmia and Metabolic changes MANAGEMENT- Bedside Assessment, Supervision of Care and Transfer INTERPRETATION -  Xrays, ECG and Blood Pressure INTERVENTIONS - hemodynamic mngmt and Metobolic interventions CASE REVIEW - Medical Sub-Specialist and Nursing TREATMENT RESPONSE -Improved PERFORMED BY - Self NOTES   : 
 
I have spent 60 minutes of critical care time involved in lab review, consultations with specialist, family decision- making, bedside attention and documentation. During this entire length of time I was immediately available to the patient. Critical Care: The reason for providing this level of medical care for this critically ill patient was due to a critical illness that impaired one or more vital organ systems such that there was a high probability of imminent or life threatening deterioration in the patients condition.  This care involved high complexity decision making to assess, manipulate, and support vital system functions. Maddison Stokes MD 
 
1:24 PM 
LAST LOOK: 
Just prior to transfer, I re-evaluated the patient. Pertinent physical exam includes Now diaphoretic and lethargic. States that he doesn't feel good. States \"I don't take insulin, I don't know why you gave it to me\". Informed pt that the regimen he got was to shift his potassium. Rechecked POC Glucose and his glucose was 30. Gave 1 amp D50 and 2 orange juices. Pt more alert and answering questions. Will feed and re-check glucose before transport. Will also send EMS with juice and gram crackers if this happens again in transport. Vitals reviewed and patient/family given a chance to ask questions. All agree with the plan to transfer. At this time, I feel that Leslie Morel is stable for transfer. Disposition: 
TRANSFER NOTE: 
11:18 AM 
Pt is being transferred to ED at [facility], transfer accepted by Dr. Jay Jay Brooks. The reasons for pt's transfer have been discussed with the pt and available family. They convey agreement and understanding for the need to be transferred as explained to them by Maddison Stokes MD. 
 
PLAN: Transfer to [facility] Diagnosis Clinical Impression: 1. Hypertensive urgency 2. ESRD (end stage renal disease) ([address] 3. Other hypervolemia 4. Acute hyperkalemia 5. Uremia Attestations: This note is prepared by Bakari Campbell acting as Scribe for MD Maddison Cannon MD : The scribe's documentation has been prepared under my direction and personally reviewed by me in its entirety. I confirm that the note above accurately reflects all work, treatment, procedures, and medical decision making performed by me.

## 2018-10-05 NOTE — ED NOTES
Dr. Kylah Alba spoke with admitting MD at [id number] [place] [id number] concerning hypoglycemic event that occurred and pt treated for BG 30. Dextrose 25g given IV.

## 2018-10-05 NOTE — CONSULTS
Consultation Note    NAME: Juan Daniel Khan   :  1970   MRN:  [id number]     Date/Time:  10/5/2018 5:24 PM    I have been asked to see this patient by Dr. Diana Shetty  for advice/opinion re: ESRD. Assessment :    Plan:  [id number]  Hyperkalemia  noncompliance Plan for urgent HD. I spoke with [facility] and they say that they should have a nurse available in about an hour. Subjective:   CHIEF COMPLAINT:  \"I missed my dialysis. \"    HISTORY OF PRESENT ILLNESS:     Corinne Bui is a 50 y.o.   male who has a history of ESRD who came to the ER because he was \"sick. \"   He can give little pertinent history. In talking with our acute HD nurses they report that the patient used to dialyze at [facility] and recently switched to [facility]. He says that something went wrong with the paperwork. He says that he is groggy but otherwise OK. No N/V. No dyspnea. No CP. Past Medical History:   Diagnosis Date    Chronic kidney disease     dialysis MWF    [address] [address]     HTN (hypertension)       Past Surgical History:   Procedure Laterality Date    COLONOSCOPY N/A 2016    COLONOSCOPY performed by Chary Rose MD at [address] [place] [address] Dr      yas sutherland l [facility]      Social History   Substance Use Topics    Smoking status: Current Every Day Smoker     Packs/day: 0.50    Smokeless tobacco: Never Used    Alcohol use No      Family History   Problem Relation Age of Onset    Hypertension Mother     Hypertension Sister     Heart Attack Other     Diabetes Other       No Known Allergies   Prior to Admission medications    Medication Sig Start Date End Date Taking? Authorizing Provider   vit Bcomp,C/folic acid/zinc (DIALYVITE 800 WITH ZINC PO) Take  by mouth. Johnathan Parsons MD   carvedilol (COREG) 25 mg tablet Take 25 mg by mouth two (2) times daily (with meals).     Johnathan Parsons MD   omeprazole (PRILOSEC) 40 mg capsule Take 40 mg by mouth daily. Johnathan Parsons MD   fosinopril (MONOPRIL) 10 mg tablet Take 40 mg by mouth daily. Johnathan Parsons MD   amLODIPine (NORVASC) 10 mg tablet Take 1 Tab by mouth daily. 9/19/16   Alberto Ríos DO   hydrALAZINE (APRESOLINE) 100 mg tablet Take 1 Tab by mouth two (2) times a day. 9/19/16   Albetro Ríos DO   sucralfate (CARAFATE) 1 gram tablet Take 1 Tab by mouth four (4) times daily.  7/28/16   Archana Loya MD   ONETOUCH ULTRA TEST strip  4/1/15   Johnathan Parsons MD   gabapentin (NEURONTIN) 300 mg capsule  2/5/15   Johnathan Parsons MD   RENAGEL 800 mg tablet  1/9/15   Johnathan Parsons MD     REVIEW OF SYSTEMS:     []  Unable to obtain reliable ROS due to  [] mental status  [] sedated   [] intubated   [x] Total of 12 systems reviewed as follows:  Constitutional: negative fever, negative chills, negative weight loss  Eyes:   negative visual changes  ENT:   negative sore throat, tongue or lip swelling  Respiratory:  negative cough, negative dyspnea  Cards:  negative for chest pain, palpitations, lower extremity edema  GI:   negative for nausea, vomiting, diarrhea, and abdominal pain  :  negative for frequency, dysuria  Integument:  negative for rash and pruritus  Heme:  negative for easy bruising and gum/nose bleeding  Musculoskel: negative for myalgias,  back pain and muscle weakness  Neuro:  negative for headaches, dizziness, vertigo  Psych:  negative for feelings of anxiety, depression   Travel?: none    Objective:   VITALS:    Visit Vitals    BP (!) 184/94    Pulse (!) 57    Resp 14    Ht 5' 6\" (1.676 m)    Wt 74.8 kg (165 lb)    SpO2 96%    BMI 26.63 kg/m2     PHYSICAL EXAM:  Gen:  []  WD []  WN  [] cachectic []  thin []  obese []  disheveled             [x]  ill apearing  []   Critical  [x]   Chronic    [x]  No acute distress    HEENT:   [] NC/AT/PERRLA/EOMI    [] pink conjunctivae      [] pale conjunctivae                  PERRL  [] yes  [] no      [] moist mucosa    [] dry mucosa    hearing intact to voice [] yes  [] No                 NECK:   supple [] yes  [] no        masses [] yes  [] No               thyroid  []  non tender  []  tender    RESP:   [x] CTA bilaterally/no wheezing/rhonchi/rales/crackles    [] rhonchi bilaterally - no dullness  [] wheezing   [] rhonchi   [] crackles     use of accessory muscles [] yes [] no    CARD:   [x]  regular rate and rhythm/No murmurs/rubs/gallops    murmur  [] yes ()  [] no      Rubs  [] yes  [x] no       Gallops [] yes  [] no    Rate []  regular  []  irregular        carotid bruits  [] Right  []  Left                 LE edema [] yes  [x] no           JVP  []  yes   []  no    ABD:    [x] soft/non distended/non tender/+bowel sounds/no HSM    []  Rigid    tenderness [] yes [] no   Liver enlargement  []  yes []  no                Spleen enlargement  []  yes []  no     distended []  yes [] no     bowel sound  [] hypoactive   [] hyperactive    LYMPH:    Neck []  yes []  no       Axillae []  yes []  no    SKIN:   Rashes []  yes   []  no    Ulcers []  yes   []  no               [] tight to palpitation    skin turgor []  good  [] poor  [] decreased               Cyanosis/clubbing []  yes []  no    NEUR:   [] cranial nerves II-XII grossly intact       [] Cranial nerves deficit                 []  facial droop    []  slurred speech   [] aphasic     [] Strength normal     []  weakness  []  LUE  []   RUE/ []  LLE  []   RLE    follows commands  []  yes []  no           PSYCH:   insight [] poor [] good   Alert and Oriented to  [] person  [] place  []  time                    [] depressed [] anxious [] agitated  [] lethargic [] stuporous  [] sedated     LAB DATA REVIEWED:    Recent Labs      10/05/18   1036   WBC  8.2   HGB  11.2*   HCT  33.4*   PLT  144*     Recent Labs      10/05/18   1036   NA  137   K  6.7*   CL  99   CO2  23   BUN  135*   CREA  20.39*   GLU  79   CA  9.2     Recent Labs      10/05/18   1036   SGOT  14*   ALT  22   AP  89   TBILI  0.7   ALB  4.0   GLOB  4.3*     No results for input(s): INR, PTP, APTT in the last 72 hours. No lab exists for component: INREXT   No results for input(s): FE, TIBC, PSAT, FERR in the last 72 hours. No results for input(s): PH, PCO2, PO2 in the last 72 hours. No results for input(s): CPK, CKMB in the last 72 hours.     No lab exists for component: TROPONINI  Lab Results   Component Value Date/Time    Glucose (POC) 138 (H) 10/05/2018 02:17 PM    Glucose (POC) 133 (H) 10/05/2018 01:23 PM    Glucose (POC) 32 (LL) 10/05/2018 01:15 PM    Glucose (POC) 30 (LL) 10/05/2018 01:13 PM    Glucose (POC) 198 (H) 09/19/2016 05:51 PM       Procedures: see electronic medical records for all procedures/Xrays and details which were not copied into this note but were reviewed prior to creation of Plan.    ________________________________________________________________________       ___________________________________________________  Consulting Physician: Shayna Siegel MD

## 2020-10-15 ENCOUNTER — TELEPHONE (OUTPATIENT)
Dept: FAMILY MEDICINE CLINIC | Age: 50
End: 2020-10-15

## 2020-10-15 NOTE — TELEPHONE ENCOUNTER
Attempted to reach pt to reschedule appointment. No answer, left detailed vm to return call. If pt's call back plz reschedule as provider won't be in clinic tomorrow. Thank you.

## 2021-11-10 ENCOUNTER — HOSPITAL ENCOUNTER (OUTPATIENT)
Age: 51
Setting detail: OUTPATIENT SURGERY
Discharge: HOME | End: 2021-11-10
Attending: SPECIALIST | Admitting: SPECIALIST
Payer: MEDICARE

## 2021-11-10 VITALS
HEART RATE: 92 BPM | OXYGEN SATURATION: 100 % | TEMPERATURE: 98.3 F | BODY MASS INDEX: 21.24 KG/M2 | RESPIRATION RATE: 18 BRPM | HEIGHT: 66 IN | SYSTOLIC BLOOD PRESSURE: 152 MMHG | WEIGHT: 132.2 LBS | DIASTOLIC BLOOD PRESSURE: 72 MMHG

## 2021-11-10 DIAGNOSIS — I25.118 CORONARY ARTERY DISEASE WITH STABLE ANGINA PECTORIS, UNSPECIFIED VESSEL OR LESION TYPE, UNSPECIFIED WHETHER NATIVE OR TRANSPLANTED HEART (HCC): ICD-10-CM

## 2021-11-10 LAB
ANION GAP SERPL CALC-SCNC: 15 MMOL/L (ref 5–15)
BUN SERPL-MCNC: 87 MG/DL (ref 6–20)
BUN/CREAT SERPL: 6 (ref 12–20)
CALCIUM SERPL-MCNC: 9.6 MG/DL (ref 8.5–10.1)
CHLORIDE SERPL-SCNC: 96 MMOL/L (ref 97–108)
CO2 SERPL-SCNC: 25 MMOL/L (ref 21–32)
CREAT SERPL-MCNC: 14.4 MG/DL (ref 0.7–1.3)
ERYTHROCYTE [DISTWIDTH] IN BLOOD BY AUTOMATED COUNT: 15.9 % (ref 11.5–14.5)
GLUCOSE SERPL-MCNC: 120 MG/DL (ref 65–100)
HCT VFR BLD AUTO: 33.9 % (ref 36.6–50.3)
HGB BLD-MCNC: 11.2 G/DL (ref 12.1–17)
MAGNESIUM SERPL-MCNC: 2.8 MG/DL (ref 1.6–2.4)
MCH RBC QN AUTO: 30 PG (ref 26–34)
MCHC RBC AUTO-ENTMCNC: 33 G/DL (ref 30–36.5)
MCV RBC AUTO: 90.9 FL (ref 80–99)
NRBC # BLD: 0 K/UL (ref 0–0.01)
NRBC BLD-RTO: 0 PER 100 WBC
PLATELET # BLD AUTO: 231 K/UL (ref 150–400)
PMV BLD AUTO: 10.3 FL (ref 8.9–12.9)
POTASSIUM SERPL-SCNC: 4.6 MMOL/L (ref 3.5–5.1)
RBC # BLD AUTO: 3.73 M/UL (ref 4.1–5.7)
SODIUM SERPL-SCNC: 136 MMOL/L (ref 136–145)
WBC # BLD AUTO: 8.8 K/UL (ref 4.1–11.1)

## 2021-11-10 PROCEDURE — 36415 COLL VENOUS BLD VENIPUNCTURE: CPT

## 2021-11-10 PROCEDURE — 74011250636 HC RX REV CODE- 250/636: Performed by: SPECIALIST

## 2021-11-10 PROCEDURE — 74011000636 HC RX REV CODE- 636: Performed by: SPECIALIST

## 2021-11-10 PROCEDURE — 77030003390 HC NDL ANGI MRTM -A: Performed by: SPECIALIST

## 2021-11-10 PROCEDURE — C1894 INTRO/SHEATH, NON-LASER: HCPCS | Performed by: SPECIALIST

## 2021-11-10 PROCEDURE — 93458 L HRT ARTERY/VENTRICLE ANGIO: CPT | Performed by: SPECIALIST

## 2021-11-10 PROCEDURE — 77030004532 HC CATH ANGI DX IMP BSC -A: Performed by: SPECIALIST

## 2021-11-10 PROCEDURE — 99152 MOD SED SAME PHYS/QHP 5/>YRS: CPT | Performed by: SPECIALIST

## 2021-11-10 PROCEDURE — 80048 BASIC METABOLIC PNL TOTAL CA: CPT

## 2021-11-10 PROCEDURE — 85027 COMPLETE CBC AUTOMATED: CPT

## 2021-11-10 PROCEDURE — C1760 CLOSURE DEV, VASC: HCPCS | Performed by: SPECIALIST

## 2021-11-10 PROCEDURE — 2709999900 HC NON-CHARGEABLE SUPPLY: Performed by: SPECIALIST

## 2021-11-10 PROCEDURE — 83735 ASSAY OF MAGNESIUM: CPT

## 2021-11-10 PROCEDURE — 74011000250 HC RX REV CODE- 250: Performed by: SPECIALIST

## 2021-11-10 DEVICE — ANGIO-SEAL VIP VASCULAR CLOSURE DEVICE
Type: IMPLANTABLE DEVICE | Status: FUNCTIONAL
Brand: ANGIO-SEAL

## 2021-11-10 RX ORDER — LIDOCAINE HYDROCHLORIDE 10 MG/ML
INJECTION INFILTRATION; PERINEURAL AS NEEDED
Status: DISCONTINUED | OUTPATIENT
Start: 2021-11-10 | End: 2021-11-10 | Stop reason: HOSPADM

## 2021-11-10 RX ORDER — MIDAZOLAM HYDROCHLORIDE 1 MG/ML
INJECTION, SOLUTION INTRAMUSCULAR; INTRAVENOUS AS NEEDED
Status: DISCONTINUED | OUTPATIENT
Start: 2021-11-10 | End: 2021-11-10 | Stop reason: HOSPADM

## 2021-11-10 RX ORDER — SODIUM CHLORIDE 0.9 % (FLUSH) 0.9 %
5-40 SYRINGE (ML) INJECTION AS NEEDED
Status: DISCONTINUED | OUTPATIENT
Start: 2021-11-10 | End: 2021-11-10 | Stop reason: HOSPADM

## 2021-11-10 RX ORDER — DIPHENHYDRAMINE HYDROCHLORIDE 50 MG/ML
25 INJECTION, SOLUTION INTRAMUSCULAR; INTRAVENOUS
Status: DISCONTINUED | OUTPATIENT
Start: 2021-11-10 | End: 2021-11-10 | Stop reason: HOSPADM

## 2021-11-10 RX ORDER — SODIUM CHLORIDE 0.9 % (FLUSH) 0.9 %
5-40 SYRINGE (ML) INJECTION EVERY 8 HOURS
Status: DISCONTINUED | OUTPATIENT
Start: 2021-11-10 | End: 2021-11-10 | Stop reason: HOSPADM

## 2021-11-10 RX ORDER — FENTANYL CITRATE 50 UG/ML
INJECTION, SOLUTION INTRAMUSCULAR; INTRAVENOUS AS NEEDED
Status: DISCONTINUED | OUTPATIENT
Start: 2021-11-10 | End: 2021-11-10 | Stop reason: HOSPADM

## 2021-11-10 RX ORDER — PANTOPRAZOLE SODIUM 20 MG/1
20 TABLET, DELAYED RELEASE ORAL DAILY
COMMUNITY

## 2021-11-10 RX ORDER — ATORVASTATIN CALCIUM 10 MG/1
10 TABLET, FILM COATED ORAL DAILY
COMMUNITY

## 2021-11-10 RX ORDER — SODIUM CHLORIDE 9 MG/ML
75 INJECTION, SOLUTION INTRAVENOUS ONCE
Status: DISCONTINUED | OUTPATIENT
Start: 2021-11-10 | End: 2021-11-10 | Stop reason: HOSPADM

## 2021-11-10 RX ORDER — HYDROCORTISONE SODIUM SUCCINATE 100 MG/2ML
100 INJECTION, POWDER, FOR SOLUTION INTRAMUSCULAR; INTRAVENOUS
Status: DISCONTINUED | OUTPATIENT
Start: 2021-11-10 | End: 2021-11-10 | Stop reason: HOSPADM

## 2021-11-10 RX ORDER — SEVELAMER CARBONATE 800 MG/1
TABLET, FILM COATED ORAL 3 TIMES DAILY
COMMUNITY

## 2021-11-10 RX ORDER — CLONIDINE HYDROCHLORIDE 0.2 MG/1
0.2 TABLET ORAL 2 TIMES DAILY
COMMUNITY

## 2021-11-10 RX ORDER — HEPARIN SODIUM 200 [USP'U]/100ML
INJECTION, SOLUTION INTRAVENOUS
Status: COMPLETED | OUTPATIENT
Start: 2021-11-10 | End: 2021-11-10

## 2021-11-10 RX ORDER — GUAIFENESIN 100 MG/5ML
81 LIQUID (ML) ORAL DAILY
COMMUNITY

## 2021-11-10 RX ORDER — NIFEDIPINE 90 MG/1
90 TABLET, FILM COATED, EXTENDED RELEASE ORAL DAILY
COMMUNITY

## 2021-11-10 NOTE — Clinical Note
TRANSFER - OUT REPORT:     Verbal report given to: Katya. Report consisted of patient's Situation, Background, Assessment and   Recommendations(SBAR). Opportunity for questions and clarification was provided. Patient transported with a Registered Nurse. [place] [facility] [place]

## 2021-11-10 NOTE — Clinical Note
Single view of the obtained using power injection. Total volume = 30 mL. Rate = 10 mL/sec. Pressure = 900 PSI. Rate of rise = 0.5 sec.  LV

## 2021-11-10 NOTE — H&P
Date of Surgery Update:  Delaney Davis was seen and examined. History and physical has been reviewed. The patient has been examined. There have been no significant clinical changes since the completion of the originally dated History and Physical.    Signed By: Paulette Sloan MD     November 10, 2021 12:05 PM         Atypical CP  Echo (7/1/21):  EF 60%, mild AS (PG 21, MG 9), mod MR. Cardiolite (7/1/21):  Normal perfusion, EF 46%. EBT (11/3/21) = 5449        Tete Yuan 1970   Office/Outpatient Visit  Visit Date: Fri, Nov 5, 2021 1:00 pm  Provider: Trina Flanagan MD (Assistant: Germain Anderson )  [address] Card[address] [address] [phone]    Electronically signed by Steven Oliver MD on  11/05/2021 01:17:15 PM                           Subjective:    CC: Mr. Gisell Boone is a 44year old Black or  male. His primary care physician is Juan Pablo Walker MD..  This is a 1.5 week follow-up visit. Since his last visit, he has had the following testing: CT.  Patient verbalized medications unchanged since last office visit. He has a history of abnormal EKG, hypercholesterolemia, and essential hypertension. HPI:           Hypercholesterolemia:  MD Notes: EBT 11/3/21 - Calcium score of 5449. Small blebs in the lungs. Current treatment includes Lipitor and diet. Regarding hypertension:  MD Notes: He states he had a cardiac catheterization a few years ago in [place]. Type Primary Hypertension Current symptoms include chest pain and shortness of breath. He denies lower extremity edema. Currently, his treatment regimen consists of daily 81 mg aspirin,  Lipitor,  Procardia XL, and a vasodilator ( hydralazine ). Prior work-up has included an echocardiogram ( results: 7/1/21 - Normal LV systolic function with an estimated ejection fraction of 60%.   The left ventricular dimension is mildly enlarged There is moderate left ventricular hypertrophy. The left atrium is moderately enlarged. There is moderate mitral regurgitation. There is moderate tricuspid regurgitation. with severe pulmonary hypertension. Mild aortic stenosis with a calculated aortic valve area of 2.2 cm2 with a peak gradient of 21 mmHg and a mean gradient of 9 mmHg. ) and a nuclear imaging study ( results: 7/1/21 - Abnormal myocardial perfusion Tetrofosmin Myoview SPECT imaging showing dilated left ventricle with global hypokinesis. By gated SPECT, post exercise global LVEF was mildly reduced. LVEF of 46%. ). MD Notes: Sinus, LVH, and anterolateral T-wave inversion. Abnormal electrocardiogram [ECG] [EKG] noted. Regarding dyspnea/shortness of breath: This tends to be worse with exertion. The shortness of breath is better rest.            Regarding chest pain:   The discomfort is located primarily in the left parasternal region. Typically, individual episodes of chest pain last only a few seconds. He characterizes the pain as pressure. There are no identifiable aggravating factors. Coronary Artery Disease: Current symptoms include chest pain and shortness of breath. Abnormal result of other cardiovascular function study noted. Prior work-up has included Lexiscan Myoview 7/1/21 - Abnormal myocardial perfusion Tetrofosmin Myoview SPECT imaging showing dilated left ventricle with global hypokinesis. By gated SPECT, post exercise global LVEF was mildly reduced. LVEF of 46%. .    Past Medical History / Family History / Social History:     Last Reviewed on 11/05/2021 01:01 PM by Pili JARA  Past Medical History:     Hypertension   Chronic renal failure   Spinal Stenosis Pre-Diabetic   INFLUENZA VACCINE: was last done 10/2021   COVID-19 VACCINE: was last done 2021 Zak Ballard x2     Past Cardiac Procedures/Tests:  Echocardiogram on 7/1/21 - Normal LV systolic function with an estimated ejection fraction of 60%.   The left ventricular dimension is mildly enlarged There is moderate left ventricular hypertrophy. The left atrium is moderately enlarged. There is moderate mitral regurgitation. There is moderate tricuspid regurgitation. with severe pulmonary hypertension. Mild aortic stenosis with a calculated aortic valve area of 2.2 cm2 with a peak gradient of 21 mmHg and a mean gradient of 9 mmHg. .  Nuclear Study:  7/1/21 - Abnormal myocardial perfusion Tetrofosmin Myoview SPECT imaging showing dilated left ventricle with global hypokinesis. By gated SPECT, post exercise global LVEF was mildly reduced. LVEF of 46%. No significant ischemia. Surgical History:   Surgical/Procedural History: NONE     Family History:   Father: Medical history unknown   Mother: breast cancer     Social History:   Social History:   Occupation: Disabled   Marital Status:    Children: 6     Tobacco/Alcohol/Supplements:   Last Reviewed on 11/05/2021 01:01 PM by Michael JARA  TOBACCO/ALCOHOL/SUPPLEMENTS   Tobacco: He has never smoked. Alcohol: Non-drinker     Substance Abuse History:   Last Reviewed on 11/05/2021 01:01 PM by Michael JARA  Substance Use/Abuse:   None     Mental Health History:   Last Reviewed on 11/05/2021 01:01 PM by Sandra, [address] [place] [address] (eg STDs):    Last Reviewed on 11/05/2021 01:01 PM by Michael JARA    Current Problems:   Last Reviewed on 11/05/2021 01:01 PM by [facility], [address] [place] [address]  Encounter for preprocedural cardiovascular examination  Pure hypercholesterolemia  Pure hypercholesterolemia, unspecified  Abnormal electrocardiogram [ECG] [EKG]  Essential (primary) hypertension  Cardiac murmur, unspecified  Hypercholesterolemia  Essential hypertension, benign  Abnormal EKG  Preoperative cardiovascular examination  Pure hyperglyceridemia  Cardiac murmur  Pure hypercholesterolemia  Dyspnea, unspecified  Other chest pain  Chest pain, unspecified    Allergies:   Last Reviewed on 11/05/2021 01:01 PM by Manuel JARA  No Known Allergies. Current Medications:   Last Reviewed on 11/05/2021 01:01 PM by Manuel JARA  hydrALAZINE 100 mg oral tablet [take 1 tablet (100 mg) by oral route 3 times per day with food]  NIFEdipine 90 mg oral Tablet, Extended Release 24 hr [take 1 tablet (90 mg) by oral route once daily]  aspirin 81 mg oral tablet, delayed release (enteric coated) [take 1 tablet (81 mg) by oral route once daily]    Objective:    Vitals:     Historical:   10/27/2021  BP:   138/70 mm Hg ( (right arm, , sitting, );) 10/27/2021  Wt:   140lbs  Current: 11/5/2021 12:59:17 PM  Ht:  5 ft, 6 in; Wt: 141 lbs;  BMI: 22. 8BP: 120/78 mm Hg (right arm, sitting)    Exams:   GENERAL:  Alert, oriented to person, place and time. HEENT:  Pinkish palpebral  conjunctivae. Anicteric sclerae. Neck: Bilateral carotid bruit   CHEST: Equal expansion. Clear breath sounds. No rales, no wheezing. Heart: Reg rate and rhythm. Grade 2/6 systolic ejection murmur at the aortic area radiating to the neck radiating to the precordium. ABDOMEN:  Soft. Normal active bowel sounds. No tenderness. EXTREMITIES:  No pitting pedal edema. Equal pulses bilaterally. AV shunt on the left arm.      Assessment:     E78.00   Pure hypercholesterolemia, unspecified     E78.00   Pure hypercholesterolemia     R01.1   Cardiac murmur, unspecified     I10   Essential (primary) hypertension     I10   Essential (primary) hypertension     R94.31   Abnormal electrocardiogram [ECG] [EKG]     E78.0   Pure hypercholesterolemia     I10   Essential (primary) hypertension     R06.00   Dyspnea, unspecified       R06.89 Other abnormalities of kaoqiqodvD57.9   Chest pain, unspecified     R07.89   Other chest pain     I25.118   Atherosclerotic heart disease of native coronary artery with other forms of angina pectoris     I25.1   Atherosclerotic heart disease of native coronary artery     R07.89   Other chest pain     R94.39   Abnormal result of other cardiovascular function study       ORDERS:     Procedures Ordered:     [id number]  Education and train for pt self-mgmt by qualified, nonphysician, josias 30 minutes; individual pt  (Send-Out)          Fito Ortiz  Cardiac Cath  (In-House)          BOY  Pulmonologist Referral  (Send-Out)          Other Orders:     [id number]  Queried Patient for Tobacco Use  (Send-Out)              Plan:     Atherosclerotic heart disease of native coronary artery with other forms of angina pectoris  1. Medication list has been reviewed. Continue current medications. Smoking Status:  Nonsmoker   2. Advised the patient regarding diet, exercise, and lifestyle modification. 3.  The patient to call the office if there is any change in his cardiac symptoms. 4.  Explained to the patient the importance of controlling his cardiac risk factors. Testing/Procedures: Cardiac Catheterization  Explained to the patient the indication, procedure, risks, and benefits of cardiac catheterization. The patient understands  and wishes to proceed with the cath to be performed as an outpatient at [facility] by Dr. Sheryl Jorge. Schedule a follow up appointment in 2 weeks. Referrals:  I am referring Mr. Nilam Brito to a pulmonologist ( Dr. Medina Vuong ). The above note was transcribed by Mahsa Mckeon and authenticated by Dr. Allan Pena prior to sign off.

## 2021-11-10 NOTE — PROCEDURES
Cath:  Normal cors (wrap-around LAD)  Normal LVF (EF 65%).   No AVG/MR  RFA angioseal    F/U with Dr. Aura Guerrero 11/24/21 @ 2pm.

## 2021-11-10 NOTE — ROUTINE PROCESS
11:44 AM  Patient arrived. ID and allergies verified verbally with patient. Pt voices understanding of procedure to be performed. Consent obtained. Pt prepped for procedure. 1650  Patient ambulates in hallway or on unit. Discharge instructions reviewed with patient and family. Voiced understanding. Patient given copy of discharge instructions to take home. Pt discharged via wheelchair with wife. Personal belongings with patient upon discharge.

## 2021-11-10 NOTE — DISCHARGE INSTRUCTIONS
Discharge Instructions:   Cardiac Catheterization/Angiography Discharge Instructions    *Check the puncture site frequently for swelling or bleeding. If you see any bleeding, lie down and apply pressure over the area and call 911. Notify your doctor for any redness, swelling, drainage or oozing from the puncture site. Notify your doctor for any fever or chills. *If the leg with the puncture becomes cold, numb or painful, call your cardiologist.    *Activity should be limited for the next 48 hours. Climb stairs as little as possible and avoid any stooping, bending or strenuous activity for 48 hours. No heavy lifting (anything over 10 pounds) for 5 days. *Do not drive for 24 hours. *You may resume your usual diet. Drink more fluids than usual.    *Have a responsible person drive you home and stay with you for at least 24 hours after your heart catheterization/angiography. *You may remove the bandage from your groin in 24 hours. You may shower in 24 hours. No tub baths, hot tubs or swimming for one week. Do not place any lotions, creams, powders, ointments over the puncture site for one week. You may place a clean band-aid over the puncture site each day for 5 days. Change this daily. Medications: Activity:     As tolerated except do not lift over 10 pounds for 5 days. Diet:     American Heart Association. Follow-up:     Follow up with Meredith Camacho MD on November 24th, 2021 at [place] [address].  [facility] [address]  [phone]      If you smoke, STOP!

## 2023-05-11 RX ORDER — SEVELAMER CARBONATE 800 MG/1
TABLET, FILM COATED ORAL 3 TIMES DAILY
COMMUNITY

## 2023-05-11 RX ORDER — HYDRALAZINE HYDROCHLORIDE 100 MG/1
TABLET, FILM COATED ORAL 2 TIMES DAILY
COMMUNITY
Start: 2016-09-19

## 2023-05-11 RX ORDER — ATORVASTATIN CALCIUM 10 MG/1
10 TABLET, FILM COATED ORAL DAILY
COMMUNITY

## 2023-05-11 RX ORDER — NIFEDIPINE 90 MG/1
90 TABLET, FILM COATED, EXTENDED RELEASE ORAL DAILY
COMMUNITY

## 2023-05-11 RX ORDER — PANTOPRAZOLE SODIUM 20 MG/1
20 TABLET, DELAYED RELEASE ORAL DAILY
COMMUNITY

## 2023-05-11 RX ORDER — CLONIDINE HYDROCHLORIDE 0.2 MG/1
TABLET ORAL 2 TIMES DAILY
COMMUNITY

## 2023-05-11 RX ORDER — ASPIRIN 81 MG/1
81 TABLET, CHEWABLE ORAL DAILY
COMMUNITY

## 2024-04-15 ENCOUNTER — HOSPITAL ENCOUNTER (EMERGENCY)
Facility: HOSPITAL | Age: 54
Discharge: HOME | End: 2024-04-15
Payer: MEDICAID

## 2024-04-15 VITALS
HEIGHT: 66 IN | TEMPERATURE: 97.9 F | DIASTOLIC BLOOD PRESSURE: 85 MMHG | OXYGEN SATURATION: 99 % | SYSTOLIC BLOOD PRESSURE: 149 MMHG | BODY MASS INDEX: 20.89 KG/M2 | WEIGHT: 130 LBS | HEART RATE: 84 BPM | RESPIRATION RATE: 20 BRPM

## 2024-04-15 DIAGNOSIS — L98.9 SKIN LESIONS, GENERALIZED: Primary | ICD-10-CM

## 2024-04-15 PROCEDURE — 99283 EMERGENCY DEPT VISIT LOW MDM: CPT

## 2024-04-15 RX ORDER — CALAMINE 8% AND ZINC OXIDE 8% 160 MG/ML
LOTION TOPICAL
Qty: 1 EACH | Refills: 0 | Status: SHIPPED | OUTPATIENT
Start: 2024-04-15

## 2024-04-15 ASSESSMENT — LIFESTYLE VARIABLES
HOW OFTEN DO YOU HAVE A DRINK CONTAINING ALCOHOL: PATIENT DECLINED
HOW MANY STANDARD DRINKS CONTAINING ALCOHOL DO YOU HAVE ON A TYPICAL DAY: PATIENT DECLINED

## 2024-04-15 ASSESSMENT — PAIN DESCRIPTION - ORIENTATION: ORIENTATION: RIGHT;LEFT

## 2024-04-15 ASSESSMENT — PAIN DESCRIPTION - DESCRIPTORS: DESCRIPTORS: ACHING

## 2024-04-15 ASSESSMENT — PAIN DESCRIPTION - PAIN TYPE: TYPE: ACUTE PAIN

## 2024-04-15 ASSESSMENT — PAIN SCALES - GENERAL: PAINLEVEL_OUTOF10: 10

## 2024-04-15 ASSESSMENT — PAIN DESCRIPTION - LOCATION: LOCATION: GENERALIZED

## 2024-04-15 ASSESSMENT — PAIN - FUNCTIONAL ASSESSMENT: PAIN_FUNCTIONAL_ASSESSMENT: 0-10

## 2024-04-16 NOTE — ED NOTES
Discharge instructions were given to the patient by Shazia Orosco RN.    The patient left the Emergency Department ambulatory, alert and oriented and in no acute distress with 1 prescription. The patient was encouraged to call or return to the ED for worsening issues or problems and was encouraged to schedule a follow up appointment for continuing care.    The patient verbalized understanding of discharge instructions and prescriptions, all questions were answered. The patient has no further concerns at this time.

## 2024-04-16 NOTE — ED NOTES
Emergency Department Nursing Plan of Care    Pt comes in with c/o lesions on skin  2 weeks. Pt says he thinks they are bed bug bites, but they appear to be from skin picking. No bed bugs visualized. Pt has large bag and is homeless.     The Nursing Plan of Care is developed from the Nursing assessment and Emergency Department Attending provider initial evaluation.  The plan of care may be reviewed in the “ED Provider note”.    The Plan of Care was developed with the following considerations:  Patient / Family readiness to learn indicated by:verbalized understanding  Persons(s) to be included in education: patient  Barriers to Learning/Limitations:None    Signed    Shazia Orosco RN    4/15/2024   10:40 PM

## 2024-04-16 NOTE — ED PROVIDER NOTES
[facility] EMERGENCY DEPT  EMERGENCY DEPARTMENT ENCOUNTER       Pt Name: Guilherme Tena  MRN: [id number]  Birthdate 1970  Date of evaluation: 4/15/2024  Provider: Shekhar Strange MD   PCP: Timur Mobley DO  Note Started: 10:32 PM EDT 4/15/24     CHIEF COMPLAINT       Chief Complaint   Patient presents with    Insect Bite        HISTORY OF PRESENT ILLNESS: 1 or more elements      History From: patient, EMS, History limited by: none     Guilherme Tena is a 53 y.o. male with history of ESRD on Monday/Wednesday/Friday dialysis (scheduled for dialysis tomorrow), diabetes and hypertension presents to the emergency department via EMS with 1 month of skin lesions.    Patient reports he lives in a shelter and \"wants to get out of there\" and for this he called EMS.  He reports he has not missed any dialysis.  He reports that he has been complaining of bedbugs and they state they have \"sprayed it\" but he needs help for these lesions.    Patient denies any fevers or chills.  No abdominal pain, nausea, vomiting or diarrhea.    Please See MDM for Additional Details of the HPI/PMH  Nursing Notes were all reviewed and agreed with or any disagreements were addressed in the HPI.     REVIEW OF SYSTEMS        Positives and Pertinent negatives as per HPI.    PAST HISTORY     Past Medical History:  Past Medical History:   Diagnosis Date    Chronic kidney disease     dialysis MWF    Diabetes (HCC)     HTN (hypertension)        Past Surgical History:  Past Surgical History:   Procedure Laterality Date    COLONOSCOPY N/A 7/28/2016    COLONOSCOPY performed by Emiliano Bermeo MD at [facility] ENDOSCOPY    VASCULAR SURGERY      dialysis shunt l arm 2012       Family History:  Family History   Problem Relation Age of Onset    Diabetes Other     Hypertension Mother     Hypertension Sister     Heart Attack Other        Social History:  Social History     Tobacco Use    Smoking status: Every Day     Current packs/day: 0.50     Types: Cigarettes

## (undated) DEVICE — PINNACLE INTRODUCER SHEATH: Brand: PINNACLE

## (undated) DEVICE — NEEDLE ANGIO 18GA L9CM NRML 1 WALL SMOOTH FINISH CLR HUB FOR

## (undated) DEVICE — CATH DIAG-D 6F MULTI PIG 155 5 -- IMPULSE 16599-302

## (undated) DEVICE — HEART CATH-MRMC: Brand: MEDLINE INDUSTRIES, INC.

## (undated) DEVICE — PROCEDURE KIT FLUID MGMT CUST MAINFOLD STRL